# Patient Record
Sex: MALE | Race: WHITE | Employment: PART TIME | URBAN - METROPOLITAN AREA
[De-identification: names, ages, dates, MRNs, and addresses within clinical notes are randomized per-mention and may not be internally consistent; named-entity substitution may affect disease eponyms.]

---

## 2024-01-05 ENCOUNTER — APPOINTMENT (OUTPATIENT)
Dept: GENERAL RADIOLOGY | Age: 24
End: 2024-01-05
Payer: MEDICARE

## 2024-01-05 ENCOUNTER — HOSPITAL ENCOUNTER (EMERGENCY)
Age: 24
Discharge: HOME | End: 2024-01-05
Attending: EMERGENCY MEDICINE
Payer: MEDICARE

## 2024-01-05 VITALS
HEIGHT: 71 IN | OXYGEN SATURATION: 98 % | WEIGHT: 130 LBS | BODY MASS INDEX: 18.2 KG/M2 | HEART RATE: 81 BPM | SYSTOLIC BLOOD PRESSURE: 122 MMHG | DIASTOLIC BLOOD PRESSURE: 79 MMHG | RESPIRATION RATE: 15 BRPM | TEMPERATURE: 98 F

## 2024-01-05 DIAGNOSIS — S93.401A SPRAIN OF RIGHT ANKLE, UNSPECIFIED LIGAMENT, INITIAL ENCOUNTER: Primary | ICD-10-CM

## 2024-01-05 PROCEDURE — 99283 EMERGENCY DEPT VISIT LOW MDM: CPT

## 2024-01-05 PROCEDURE — 73610 X-RAY EXAM OF ANKLE: CPT

## 2024-01-05 PROCEDURE — 6370000000 HC RX 637 (ALT 250 FOR IP): Performed by: EMERGENCY MEDICINE

## 2024-01-05 RX ORDER — IBUPROFEN 600 MG/1
600 TABLET ORAL
Status: COMPLETED | OUTPATIENT
Start: 2024-01-05 | End: 2024-01-05

## 2024-01-05 RX ORDER — METHYLPHENIDATE HYDROCHLORIDE 10 MG/1
10 TABLET ORAL DAILY
COMMUNITY

## 2024-01-05 RX ORDER — IBUPROFEN 600 MG/1
600 TABLET ORAL EVERY 8 HOURS PRN
Qty: 20 TABLET | Refills: 0 | Status: SHIPPED | OUTPATIENT
Start: 2024-01-05

## 2024-01-05 RX ADMIN — IBUPROFEN 600 MG: 600 TABLET, FILM COATED ORAL at 03:07

## 2024-01-05 ASSESSMENT — PAIN SCALES - GENERAL: PAINLEVEL_OUTOF10: 8

## 2024-01-05 ASSESSMENT — LIFESTYLE VARIABLES
HOW MANY STANDARD DRINKS CONTAINING ALCOHOL DO YOU HAVE ON A TYPICAL DAY: PATIENT DOES NOT DRINK
HOW OFTEN DO YOU HAVE A DRINK CONTAINING ALCOHOL: NEVER

## 2024-01-05 NOTE — ED TRIAGE NOTES
Pt arrives ambulatory in triage with complaint of right ankle pain following unwitnessed mechanical fall from 3 step ladder 12am this morning. Denies hitting his head. No swelling or deformity noted

## 2024-01-05 NOTE — ED NOTES
I have reviewed discharge instructions with the patient.  The patient verbalized understanding.    Patient left ED via Discharge Method: ambulatory to Home with father.    Opportunity for questions and clarification provided.       Patient given 1 scripts.         To continue your aftercare when you leave the hospital, you may receive an automated call from our care team to check in on how you are doing.  This is a free service and part of our promise to provide the best care and service to meet your aftercare needs.” If you have questions, or wish to unsubscribe from this service please call [phone].  Thank you for Choosing our [facility] Emergency Department.

## 2024-01-05 NOTE — ED PROVIDER NOTES
Emergency Department Provider Note       PCP: No primary care provider on file.   Age: 23 y.o.   Sex: male     DISPOSITION         ICD-10-CM    1. Sprain of right ankle, unspecified ligament, initial encounter  S93.401A           Medical Decision Making     Complexity of Problems Addressed:  Complexity of Problem: 1 acute, uncomplicated illness or injury.    Data Reviewed and Analyzed:  I independently ordered and reviewed each unique test.       I interpreted the X-rays.  X-ray right ankle reveals no evidence of fracture or dislocation.    Discussion of management or test interpretation.  23-year-old male presents to the emergency department with complaint of right ankle pain following unwitnessed mechanical fall off a small ladder while attempting to glue/caulk a ceiling in a friend's house at approximately 12 AM.  Pain is worse with ambulation and weightbearing, improved with remaining still.  No history of prior injury.  On exam, patient is in no distress, legs crossed on the cart.  Right ankle reveals no evidence of bony deformity or soft tissue swelling is mildly tender at the lateral malleolus.  X-rays negative for evidence of fracture.  Patient be discharged home on ibuprofen.       Risk of Complications and/or Morbidity of Patient Management:  Prescription drug management performed    History      23-year-old male presents to the emergency department with complaint of right ankle pain following unwitnessed mechanical fall off a small ladder while attempting to glue/caulk a ceiling in a friend's house at approximately 12 AM.  Pain is worse with ambulation and weightbearing, improved with remaining still.  No history of prior injury.    The history is provided by the patient.        Physical Exam     Vitals signs and nursing note reviewed.   Vitals:    01/05/24 0249   BP: 122/79   Pulse: 81   Resp: 15   Temp: 98 °F (36.7 °C)   SpO2: 98%   Weight: 59 kg (130 lb)   Height: 1.803 m (5' 11\")       Physical

## 2024-10-01 ENCOUNTER — HOSPITAL ENCOUNTER (EMERGENCY)
Age: 24
Discharge: HOME | End: 2024-10-04
Payer: MEDICARE

## 2024-10-01 DIAGNOSIS — S39.012A STRAIN OF LUMBAR REGION, INITIAL ENCOUNTER: Primary | ICD-10-CM

## 2024-10-01 PROCEDURE — 99282 EMERGENCY DEPT VISIT SF MDM: CPT

## 2024-10-01 PROCEDURE — 99281 EMR DPT VST MAYX REQ PHY/QHP: CPT

## 2024-10-07 ENCOUNTER — HOSPITAL ENCOUNTER (OUTPATIENT)
Dept: GENERAL RADIOLOGY | Age: 24
Discharge: HOME | End: 2024-10-10
Payer: MEDICARE

## 2024-10-07 DIAGNOSIS — M54.59 OTHER LOW BACK PAIN: ICD-10-CM

## 2024-10-07 PROCEDURE — 72100 X-RAY EXAM L-S SPINE 2/3 VWS: CPT

## 2024-10-10 NOTE — ED PROVIDER NOTES
R-0Print              No results found for any visits on 10/01/24.      No orders to display                No results for input(s): \"COVID19\" in the last 72 hours.    Voice dictation software was used during the making of this note.  This software is not perfect and grammatical and other typographical errors may be present.  This note has not been completely proofread for errors.     Dali Marino PA-C  10/10/24 0895